# Patient Record
Sex: FEMALE | Race: WHITE
[De-identification: names, ages, dates, MRNs, and addresses within clinical notes are randomized per-mention and may not be internally consistent; named-entity substitution may affect disease eponyms.]

---
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
## 2022-11-18 ENCOUNTER — HOSPITAL ENCOUNTER (OUTPATIENT)
Dept: HOSPITAL 92 - SCSRAD | Age: 22
Discharge: HOME | End: 2022-11-18
Attending: CHIROPRACTOR
Payer: COMMERCIAL

## 2022-11-18 DIAGNOSIS — M54.50: Primary | ICD-10-CM

## 2022-11-18 PROCEDURE — 72110 X-RAY EXAM L-2 SPINE 4/>VWS: CPT

## 2023-07-04 ENCOUNTER — HOSPITAL ENCOUNTER (OUTPATIENT)
Dept: HOSPITAL 92 - CSHLD/OP | Age: 23
LOS: 1 days | Discharge: HOME | End: 2023-07-05
Attending: OBSTETRICS & GYNECOLOGY
Payer: COMMERCIAL

## 2023-07-04 DIAGNOSIS — O36.8130: Primary | ICD-10-CM

## 2023-07-04 DIAGNOSIS — Z3A.29: ICD-10-CM

## 2023-07-04 DIAGNOSIS — O47.02: ICD-10-CM

## 2023-07-04 DIAGNOSIS — J45.909: ICD-10-CM

## 2023-07-04 DIAGNOSIS — Z88.1: ICD-10-CM

## 2023-07-04 DIAGNOSIS — Z88.5: ICD-10-CM

## 2023-07-04 DIAGNOSIS — O99.513: ICD-10-CM

## 2023-07-04 DIAGNOSIS — Z79.899: ICD-10-CM

## 2023-07-04 PROCEDURE — 99282 EMERGENCY DEPT VISIT SF MDM: CPT

## 2023-07-04 PROCEDURE — 76815 OB US LIMITED FETUS(S): CPT

## 2023-08-14 ENCOUNTER — HOSPITAL ENCOUNTER (OUTPATIENT)
Dept: HOSPITAL 92 - CSHLD/OP | Age: 23
Discharge: HOME | End: 2023-08-14
Attending: OBSTETRICS & GYNECOLOGY
Payer: COMMERCIAL

## 2023-08-14 VITALS — BODY MASS INDEX: 33.4 KG/M2

## 2023-08-14 DIAGNOSIS — R51.9: ICD-10-CM

## 2023-08-14 DIAGNOSIS — Z3A.35: ICD-10-CM

## 2023-08-14 DIAGNOSIS — O99.891: Primary | ICD-10-CM

## 2023-08-14 DIAGNOSIS — O99.513: ICD-10-CM

## 2023-08-14 DIAGNOSIS — N89.8: ICD-10-CM

## 2023-08-14 DIAGNOSIS — Z88.1: ICD-10-CM

## 2023-08-14 DIAGNOSIS — H53.8: ICD-10-CM

## 2023-08-14 DIAGNOSIS — J45.909: ICD-10-CM

## 2023-08-14 DIAGNOSIS — Z88.6: ICD-10-CM

## 2023-08-14 DIAGNOSIS — Z91.040: ICD-10-CM

## 2023-08-14 LAB
ALBUMIN SERPL BCG-MCNC: 3.4 G/DL (ref 3.5–5)
ALP SERPL-CCNC: 80 U/L (ref 40–110)
ALT SERPL W P-5'-P-CCNC: 8 U/L (ref 8–55)
ANION GAP SERPL CALC-SCNC: 11 MMOL/L (ref 10–20)
AST SERPL-CCNC: 13 U/L (ref 5–34)
BACTERIA UR QL AUTO: (no result) HPF
BASOPHILS # BLD AUTO: 0 10X3/UL (ref 0–0.2)
BASOPHILS NFR BLD AUTO: 0.1 % (ref 0–2)
BILIRUB SERPL-MCNC: 0.3 MG/DL (ref 0.2–1.2)
BUN SERPL-MCNC: 6 MG/DL (ref 7–18.7)
CALCIUM SERPL-MCNC: 8.8 MG/DL (ref 7.8–10.44)
CHLORIDE SERPL-SCNC: 106 MMOL/L (ref 98–107)
CO2 SERPL-SCNC: 24 MMOL/L (ref 22–29)
CREAT CL PREDICTED SERPL C-G-VRATE: 197 ML/MIN (ref 70–130)
EOSINOPHIL # BLD AUTO: 0.1 10X3/UL (ref 0–0.5)
EOSINOPHIL NFR BLD AUTO: 1.4 % (ref 0–6)
GLOBULIN SER CALC-MCNC: 2.6 G/DL (ref 2.4–3.5)
GLUCOSE SERPL-MCNC: 87 MG/DL (ref 70–105)
GLUCOSE UR STRIP-MCNC: 50 MG/DL
HCT VFR BLD CALC: 31.9 % (ref 34.9–44.5)
HGB BLD-MCNC: 10.8 G/DL (ref 12–15.5)
LEUKOCYTE ESTERASE UR QL STRIP.AUTO: 25
LYMPHOCYTES NFR BLD AUTO: 23.4 % (ref 18–47)
MCH RBC QN AUTO: 30.6 PG (ref 27–33)
MCV RBC AUTO: 90.4 FL (ref 81.6–98.3)
MONOCYTES # BLD AUTO: 0.8 10X3/UL (ref 0–1.1)
MONOCYTES NFR BLD AUTO: 8.5 % (ref 0–10)
NEUTROPHILS # BLD AUTO: 6.2 10X3/UL (ref 1.5–8.4)
NEUTROPHILS NFR BLD AUTO: 66.3 % (ref 40–75)
PLATELET # BLD AUTO: 172 10X3/UL (ref 150–450)
POTASSIUM SERPL-SCNC: 3.7 MMOL/L (ref 3.5–5.1)
PROT UR-MCNC: 10 MG/DL (ref 1–14)
RBC # BLD AUTO: 3.53 10X6/UL (ref 3.9–5.03)
RBC UR QL AUTO: (no result) HPF (ref 0–3)
SODIUM SERPL-SCNC: 137 MMOL/L (ref 136–145)
SP GR UR STRIP: 1.02 (ref 1–1.03)
WBC # BLD AUTO: 9.3 10X3/UL (ref 3.5–10.5)
WBC UR QL AUTO: (no result) HPF (ref 0–3)

## 2023-08-14 PROCEDURE — 80053 COMPREHEN METABOLIC PANEL: CPT

## 2023-08-14 PROCEDURE — 87480 CANDIDA DNA DIR PROBE: CPT

## 2023-08-14 PROCEDURE — 87510 GARDNER VAG DNA DIR PROBE: CPT

## 2023-08-14 PROCEDURE — 87591 N.GONORRHOEAE DNA AMP PROB: CPT

## 2023-08-14 PROCEDURE — 82570 ASSAY OF URINE CREATININE: CPT

## 2023-08-14 PROCEDURE — 87491 CHLMYD TRACH DNA AMP PROBE: CPT

## 2023-08-14 PROCEDURE — 36415 COLL VENOUS BLD VENIPUNCTURE: CPT

## 2023-08-14 PROCEDURE — 84112 EVAL AMNIOTIC FLUID PROTEIN: CPT

## 2023-08-14 PROCEDURE — 85025 COMPLETE CBC W/AUTO DIFF WBC: CPT

## 2023-08-14 PROCEDURE — 81001 URINALYSIS AUTO W/SCOPE: CPT

## 2023-08-14 PROCEDURE — 84156 ASSAY OF PROTEIN URINE: CPT

## 2023-08-14 PROCEDURE — 87660 TRICHOMONAS VAGIN DIR PROBE: CPT

## 2023-08-14 PROCEDURE — 87086 URINE CULTURE/COLONY COUNT: CPT

## 2023-09-17 ENCOUNTER — HOSPITAL ENCOUNTER (INPATIENT)
Dept: HOSPITAL 92 - CSHLD | Age: 23
LOS: 3 days | Discharge: HOME | End: 2023-09-20
Attending: OBSTETRICS & GYNECOLOGY | Admitting: OBSTETRICS & GYNECOLOGY
Payer: COMMERCIAL

## 2023-09-17 VITALS — BODY MASS INDEX: 34.7 KG/M2

## 2023-09-17 DIAGNOSIS — Z98.890: ICD-10-CM

## 2023-09-17 DIAGNOSIS — O48.0: Primary | ICD-10-CM

## 2023-09-17 DIAGNOSIS — Z3A.40: ICD-10-CM

## 2023-09-17 DIAGNOSIS — Z91.040: ICD-10-CM

## 2023-09-17 DIAGNOSIS — Z88.1: ICD-10-CM

## 2023-09-17 DIAGNOSIS — Z88.5: ICD-10-CM

## 2023-09-17 LAB
HBSAG INDEX: 0.17 S/CO (ref 0–0.99)
HCT VFR BLD CALC: 33.5 % (ref 34.9–44.5)
HGB BLD-MCNC: 11.6 G/DL (ref 12–15.5)
MCH RBC QN AUTO: 30.4 PG (ref 27–33)
MCV RBC AUTO: 87.9 FL (ref 81.6–98.3)
PLATELET # BLD AUTO: 175 10X3/UL (ref 150–450)
RBC # BLD AUTO: 3.81 10X6/UL (ref 3.9–5.03)
SYPHILIS ANTIBODY INDEX: 0.05 S/CO
WBC # BLD AUTO: 9.7 10X3/UL (ref 3.5–10.5)

## 2023-09-17 PROCEDURE — 86780 TREPONEMA PALLIDUM: CPT

## 2023-09-17 PROCEDURE — 36415 COLL VENOUS BLD VENIPUNCTURE: CPT

## 2023-09-17 PROCEDURE — 51702 INSERT TEMP BLADDER CATH: CPT

## 2023-09-17 PROCEDURE — 86850 RBC ANTIBODY SCREEN: CPT

## 2023-09-17 PROCEDURE — 87340 HEPATITIS B SURFACE AG IA: CPT

## 2023-09-17 PROCEDURE — 86900 BLOOD TYPING SEROLOGIC ABO: CPT

## 2023-09-17 PROCEDURE — 86901 BLOOD TYPING SEROLOGIC RH(D): CPT

## 2023-09-17 PROCEDURE — 85027 COMPLETE CBC AUTOMATED: CPT

## 2023-09-18 PROCEDURE — 0HQ9XZZ REPAIR PERINEUM SKIN, EXTERNAL APPROACH: ICD-10-PCS | Performed by: OBSTETRICS & GYNECOLOGY

## 2023-09-18 RX ADMIN — ONDANSETRON PRN MG: 2 INJECTION INTRAMUSCULAR; INTRAVENOUS at 17:04

## 2023-09-18 RX ADMIN — ONDANSETRON PRN MG: 2 INJECTION INTRAMUSCULAR; INTRAVENOUS at 10:28

## 2023-09-20 VITALS — DIASTOLIC BLOOD PRESSURE: 79 MMHG | TEMPERATURE: 97.9 F | SYSTOLIC BLOOD PRESSURE: 115 MMHG
